# Patient Record
Sex: MALE | Race: WHITE | NOT HISPANIC OR LATINO | Employment: OTHER | URBAN - METROPOLITAN AREA
[De-identification: names, ages, dates, MRNs, and addresses within clinical notes are randomized per-mention and may not be internally consistent; named-entity substitution may affect disease eponyms.]

---

## 2024-03-21 ENCOUNTER — LAB VISIT (OUTPATIENT)
Dept: LAB | Facility: HOSPITAL | Age: 74
End: 2024-03-21
Attending: UROLOGY
Payer: MEDICARE

## 2024-03-21 ENCOUNTER — OFFICE VISIT (OUTPATIENT)
Dept: UROLOGY | Facility: CLINIC | Age: 74
End: 2024-03-21
Payer: MEDICARE

## 2024-03-21 VITALS
HEART RATE: 77 BPM | WEIGHT: 151.88 LBS | BODY MASS INDEX: 22.49 KG/M2 | DIASTOLIC BLOOD PRESSURE: 76 MMHG | HEIGHT: 69 IN | RESPIRATION RATE: 16 BRPM | SYSTOLIC BLOOD PRESSURE: 116 MMHG

## 2024-03-21 DIAGNOSIS — C61 PROSTATE CA: ICD-10-CM

## 2024-03-21 DIAGNOSIS — Z87.442 PERSONAL HISTORY OF URINARY CALCULI: ICD-10-CM

## 2024-03-21 DIAGNOSIS — R35.0 FREQUENCY OF MICTURITION: ICD-10-CM

## 2024-03-21 DIAGNOSIS — R35.1 NOCTURIA: Primary | ICD-10-CM

## 2024-03-21 LAB
BILIRUB UR QL STRIP: NEGATIVE
GLUCOSE UR QL STRIP: NEGATIVE
KETONES UR QL STRIP: NEGATIVE
LEUKOCYTE ESTERASE UR QL STRIP: NEGATIVE
PH, POC UA: 5.5
POC BLOOD, URINE: NEGATIVE
POC NITRATES, URINE: NEGATIVE
POC RESIDUAL URINE VOLUME: 0 ML (ref 0–100)
PROT UR QL STRIP: NEGATIVE
SP GR UR STRIP: 1.03 (ref 1–1.03)
UROBILINOGEN UR STRIP-ACNC: 0.2 (ref 0.3–2.2)

## 2024-03-21 PROCEDURE — 81003 URINALYSIS AUTO W/O SCOPE: CPT | Mod: PBBFAC,PN | Performed by: UROLOGY

## 2024-03-21 PROCEDURE — 84153 ASSAY OF PSA TOTAL: CPT | Performed by: UROLOGY

## 2024-03-21 PROCEDURE — 99999 PR PBB SHADOW E&M-NEW PATIENT-LVL III: CPT | Mod: PBBFAC,,, | Performed by: UROLOGY

## 2024-03-21 PROCEDURE — 36415 COLL VENOUS BLD VENIPUNCTURE: CPT | Mod: PN | Performed by: UROLOGY

## 2024-03-21 PROCEDURE — 99999PBSHW POCT URINALYSIS, DIPSTICK, AUTOMATED, W/O SCOPE: Mod: PBBFAC,,,

## 2024-03-21 PROCEDURE — 51798 US URINE CAPACITY MEASURE: CPT | Mod: PBBFAC,PN | Performed by: UROLOGY

## 2024-03-21 PROCEDURE — 99999PBSHW POCT BLADDER SCAN: Mod: PBBFAC,,,

## 2024-03-21 PROCEDURE — 99203 OFFICE O/P NEW LOW 30 MIN: CPT | Mod: PBBFAC,PN,25 | Performed by: UROLOGY

## 2024-03-21 PROCEDURE — 99214 OFFICE O/P EST MOD 30 MIN: CPT | Mod: S$PBB,,, | Performed by: UROLOGY

## 2024-03-21 RX ORDER — IBUPROFEN 100 MG/5ML
1 SUSPENSION, ORAL (FINAL DOSE FORM) ORAL EVERY MORNING
COMMUNITY

## 2024-03-21 RX ORDER — OMEPRAZOLE 20 MG/1
20 CAPSULE, DELAYED RELEASE ORAL 2 TIMES DAILY
COMMUNITY

## 2024-03-21 RX ORDER — HYDROCODONE BITARTRATE AND ACETAMINOPHEN 10; 325 MG/1; MG/1
1 TABLET ORAL
COMMUNITY

## 2024-03-21 RX ORDER — MELOXICAM 15 MG/1
1 TABLET ORAL EVERY MORNING
COMMUNITY
Start: 2024-03-06

## 2024-03-21 RX ORDER — CYCLOBENZAPRINE HCL 10 MG
10 TABLET ORAL 3 TIMES DAILY PRN
COMMUNITY
Start: 2024-01-04

## 2024-03-21 RX ORDER — ALPRAZOLAM 0.5 MG/1
1 TABLET ORAL NIGHTLY
COMMUNITY
Start: 2023-07-05

## 2024-03-21 RX ORDER — CHOLECALCIFEROL (VITAMIN D3) 25 MCG
2 TABLET ORAL EVERY MORNING
COMMUNITY

## 2024-03-21 RX ORDER — SOLIFENACIN SUCCINATE 5 MG/1
5 TABLET, FILM COATED ORAL NIGHTLY
Qty: 90 TABLET | Refills: 1 | Status: SHIPPED | OUTPATIENT
Start: 2024-03-21 | End: 2025-03-21

## 2024-03-21 RX ORDER — TRAZODONE HYDROCHLORIDE 150 MG/1
1 TABLET ORAL NIGHTLY
COMMUNITY
Start: 2024-02-05

## 2024-03-21 NOTE — PROGRESS NOTES
Subjective:       Patient ID: Lobo Guerrier is a 73 y.o. male.    Chief Complaint: Prostate Cancer      History of Present Illness:     Mr Guerrier says that he recently stopped taking myrbetriq 25 mg that I prescribed him on 6-26-23 because it was too expensive.  He says that it was not working either.  Nocturia X 3.  He has daytime urinary frequency.  Infrequent urgency.  No urinary incontinence.  Occasional intermittent urinary stream.  He says that he was treated with a 10 day course of an antibiotic in February 2024 for a UTI.  He says he had some blood in his urine at that time that resolved with the antibiotic.  No further blood in his urine.  No dysuria.  No flank pain.  He is here today with his wife.         Past Medical History:   Diagnosis Date    Prostate CA     Skin cancer     Throat cancer      Family History   Problem Relation Age of Onset    Cancer Father     Kidney cancer Father     Diabetes Mother     Kidney cancer Mother     No Known Problems Paternal Aunt     Cancer Paternal Uncle     No Known Problems Paternal Grandmother     Cancer Paternal Grandfather     No Known Problems Maternal Grandmother     No Known Problems Maternal Grandfather     No Known Problems Other     Throat cancer Brother      Social History     Socioeconomic History    Marital status:    Tobacco Use    Smoking status: Former     Types: Cigarettes    Smokeless tobacco: Never    Tobacco comments:     Quit in 2007   Substance and Sexual Activity    Alcohol use: Not Currently    Drug use: Never    Sexual activity: Not Currently     Partners: Female     Outpatient Encounter Medications as of 3/21/2024   Medication Sig Dispense Refill    ALPRAZolam (XANAX) 0.5 MG tablet Take 1 tablet by mouth every evening.      ascorbic acid, vitamin C, (VITAMIN C) 1000 MG tablet Take 1 tablet by mouth every morning.      cyclobenzaprine (FLEXERIL) 10 MG tablet Take 10 mg by mouth 3 (three) times daily as needed for Muscle spasms.       "HYDROcodone-acetaminophen (NORCO)  mg per tablet Take 1 tablet by mouth every 24 hours as needed for Pain (Half a tablet at night).      meloxicam (MOBIC) 15 MG tablet Take 1 tablet by mouth every morning.      omeprazole (PRILOSEC) 20 MG capsule Take 20 mg by mouth 2 (two) times daily.      traZODone (DESYREL) 150 MG tablet Take 1 tablet by mouth every evening.      vitamin D (VITAMIN D3) 1000 units Tab Take 2 tablets by mouth every morning.      solifenacin (VESICARE) 5 MG tablet Take 1 tablet (5 mg total) by mouth every evening. 90 tablet 1     No facility-administered encounter medications on file as of 3/21/2024.        Review of Systems   Constitutional:  Negative for chills and fever.   Respiratory:  Negative for shortness of breath.    Cardiovascular:  Negative for chest pain.   Gastrointestinal:  Negative for nausea and vomiting.   Genitourinary:  Negative for hematuria.   Musculoskeletal:  Negative for back pain.   Skin:  Negative for rash.   Neurological:  Negative for dizziness.   Psychiatric/Behavioral:  Negative for agitation.        Objective:     /76 (BP Location: Left arm, Patient Position: Sitting)   Pulse 77   Resp 16   Ht 5' 9" (1.753 m)   Wt 68.9 kg (151 lb 14.4 oz)   BMI 22.43 kg/m²     Physical Exam  Constitutional:       Appearance: Normal appearance.   Pulmonary:      Effort: Pulmonary effort is normal.   Abdominal:      Palpations: Abdomen is soft.   Neurological:      Mental Status: He is alert and oriented to person, place, and time.   Psychiatric:         Mood and Affect: Mood normal.         Office Visit on 03/21/2024   Component Date Value Ref Range Status    POC Blood, Urine 03/21/2024 Negative  Negative Final    POC Bilirubin, Urine 03/21/2024 Negative  Negative Final    POC Urobilinogen, Urine 03/21/2024 0.2 (A)  0.3 - 2.2 Final    POC Ketones, Urine 03/21/2024 Negative  Negative Final    POC Protein, Urine 03/21/2024 Negative  Negative Final    POC Nitrates, Urine " 03/21/2024 Negative  Negative Final    POC Glucose, Urine 03/21/2024 Negative  Negative Final    pH, UA 03/21/2024 5.5   Final    POC Specific Gravity, Urine 03/21/2024 1.030 (A)  1.003 - 1.029 Final    POC Leukocytes, Urine 03/21/2024 Negative  Negative Final    POC Residual Urine Volume 03/21/2024 0  0 - 100 mL Final        Results for orders placed or performed in visit on 03/21/24 (from the past 8760 hour(s))   POCT Bladder Scan   Result Value    POC Residual Urine Volume 0          6-26-23  PSA <0.006    12-20-22  PSA <0.006      I reviewed all of the above lab results.         12-20-22  Renal ultrasound.  No renal stone is seen bilaterally.  No hydronephrosis bilaterally.  1.2 cm right renal cyst.  1.7 cm and 1 cm left renal cysts.    12-20-22  KUB.  No renal stone is seen bilaterally.  No stone is seen along the course of his ureters.  A few left greater than right pelvic calcifications that are likely phleboliths.      I reviewed all of the above imaging results.       Assessment:       1. Nocturia    2. Prostate CA    3. Frequency of micturition    4. Personal history of urinary calculi      Plan:     Orders Placed This Encounter    PROSTATE SPECIFIC ANTIGEN, DIAGNOSTIC    POCT Urinalysis, Dipstick, Automated, W/O Scope    POCT Bladder Scan    solifenacin (VESICARE) 5 MG tablet        Assessment:  - Nocturia and frequency.  He recently stopped taking myrbetriq 25 mg that I prescribed him on 6-26-23 because it was too expensive and it was not working.  - Prostate cancer s/p robotic radical prostatectomy with bilateral pelvic lymphadenectomy by me on 7-16-18.  Path:  Prostate adenocarcinoma allyson 3+4=7, 20% pattern 4, perineural invasion is present, right and left apical margins are involved, pT2N0.    - All of his PSAs have been undetectable since his surgery.  - History of a 1.2 cm right proximal ureteral stone s/p right USE with laser lithotripsy with right ureteral stent exchange on 6-11-21.  His right  ureteral stent was removed cystoscopically on 6-15-21.  - History of a few ESWLs in the past.  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  He has completed radiation and chemotherapy treatments.    Plan:  - PSA today.  - Started solifenacin 5 mg 1 PO qhs.  I told him to let me know if his mouth gets too dry on the solifenacin.  - I discussed dietary modifications with him today and I recommended he drink mostly water during the day.  - I recommended he limit his fluid intake at night to small amounts of water and to void just prior to bedtime.   - RTC in 6 months with a PVR on arrival.

## 2024-03-22 LAB — COMPLEXED PSA SERPL-MCNC: <0.01 NG/ML (ref 0–4)

## 2024-03-25 DIAGNOSIS — C61 PROSTATE CANCER: Primary | ICD-10-CM

## 2024-03-26 ENCOUNTER — TELEPHONE (OUTPATIENT)
Dept: UROLOGY | Facility: CLINIC | Age: 74
End: 2024-03-26
Payer: MEDICARE

## 2024-03-26 NOTE — TELEPHONE ENCOUNTER
I called and spoke with Mr. Guerrier, he is aware of details, I scheduled his lab appointment a few days prior to his appointment and sent him an email in regards to all of his appointment dates.    ----- Message from Ibrahima Mcqueen MD sent at 3/25/2024  4:51 PM CDT -----  I called Mr Guerrier 2 times and he did not answer the phone either time and I left him a voice message.  Please call Mr Guerrier to see if he got my voice message that said that I reviewed his PSA is undetectable at <0.01 which is great.  Remind him of the details of his follow up appointment in 6 months in [place].  Let him know that I ordered a PSA in that he can do a few days prior to his 6 month follow up appointment.  Ask him if he has any questions.

## 2024-09-30 ENCOUNTER — LAB VISIT (OUTPATIENT)
Dept: LAB | Facility: HOSPITAL | Age: 74
End: 2024-09-30
Attending: UROLOGY
Payer: MEDICARE

## 2024-09-30 DIAGNOSIS — C61 PROSTATE CANCER: ICD-10-CM

## 2024-09-30 PROCEDURE — 36415 COLL VENOUS BLD VENIPUNCTURE: CPT | Mod: PN | Performed by: UROLOGY

## 2024-09-30 PROCEDURE — 84153 ASSAY OF PSA TOTAL: CPT | Performed by: UROLOGY

## 2024-10-01 LAB — COMPLEXED PSA SERPL-MCNC: <0.01 NG/ML (ref 0–4)

## 2024-10-03 ENCOUNTER — OFFICE VISIT (OUTPATIENT)
Dept: UROLOGY | Facility: CLINIC | Age: 74
End: 2024-10-03
Payer: MEDICARE

## 2024-10-03 VITALS
HEIGHT: 69 IN | WEIGHT: 152.56 LBS | RESPIRATION RATE: 18 BRPM | DIASTOLIC BLOOD PRESSURE: 72 MMHG | HEART RATE: 73 BPM | BODY MASS INDEX: 22.6 KG/M2 | TEMPERATURE: 98 F | SYSTOLIC BLOOD PRESSURE: 134 MMHG

## 2024-10-03 DIAGNOSIS — N32.81 OVERACTIVE BLADDER: ICD-10-CM

## 2024-10-03 DIAGNOSIS — C61 PROSTATE CANCER: ICD-10-CM

## 2024-10-03 DIAGNOSIS — Z87.442 PERSONAL HISTORY OF URINARY CALCULI: ICD-10-CM

## 2024-10-03 DIAGNOSIS — R35.1 NOCTURIA: Primary | ICD-10-CM

## 2024-10-03 DIAGNOSIS — N39.3 SUI (STRESS URINARY INCONTINENCE), MALE: ICD-10-CM

## 2024-10-03 LAB
BILIRUB UR QL STRIP: NEGATIVE
GLUCOSE UR QL STRIP: NEGATIVE
KETONES UR QL STRIP: NEGATIVE
LEUKOCYTE ESTERASE UR QL STRIP: NEGATIVE
PH, POC UA: 6.5
POC BLOOD, URINE: NEGATIVE
POC NITRATES, URINE: NEGATIVE
POC RESIDUAL URINE VOLUME: 43 ML (ref 0–100)
PROT UR QL STRIP: NEGATIVE
SP GR UR STRIP: 1.02 (ref 1–1.03)
UROBILINOGEN UR STRIP-ACNC: 1 (ref 0.3–2.2)

## 2024-10-03 PROCEDURE — 81003 URINALYSIS AUTO W/O SCOPE: CPT | Mod: PBBFAC,PN | Performed by: UROLOGY

## 2024-10-03 PROCEDURE — 99999PBSHW POCT URINALYSIS, DIPSTICK, AUTOMATED, W/O SCOPE: Mod: PBBFAC,,,

## 2024-10-03 PROCEDURE — 99214 OFFICE O/P EST MOD 30 MIN: CPT | Mod: S$PBB,,, | Performed by: UROLOGY

## 2024-10-03 PROCEDURE — 99999 PR PBB SHADOW E&M-EST. PATIENT-LVL III: CPT | Mod: PBBFAC,,, | Performed by: UROLOGY

## 2024-10-03 PROCEDURE — 99213 OFFICE O/P EST LOW 20 MIN: CPT | Mod: PBBFAC,PN | Performed by: UROLOGY

## 2024-10-03 PROCEDURE — 51798 US URINE CAPACITY MEASURE: CPT | Mod: PBBFAC,PN | Performed by: UROLOGY

## 2024-10-03 PROCEDURE — 99999PBSHW POCT BLADDER SCAN: Mod: PBBFAC,,,

## 2024-10-03 RX ORDER — SOLIFENACIN SUCCINATE 5 MG/1
5 TABLET, FILM COATED ORAL NIGHTLY
Qty: 90 TABLET | Refills: 3 | Status: SHIPPED | OUTPATIENT
Start: 2024-10-03 | End: 2025-10-03

## 2024-10-03 NOTE — PROGRESS NOTES
Subjective:       Patient ID: Lobo Guerrier is a 74 y.o. male.    Chief Complaint: Follow-up (6 month f/u)      History of Present Illness:     Mr Guerrier is here today with his wife.  His nocturia improved from X 3 on average to X 1 on solifenacin 5 mg.  Urinary urgency and frequency has improved.  He has dry mouth that has not worsened on the solifenacin 5 mg.  He stopped taking myrbetriq 25 mg that I prescribed him on 6-26-23 because it was too expensive and it was not working.  He has mild infrequent DEYVI.  He wears 1 depends daily.    He underwent a robotic radical prostatectomy with bilateral pelvic lymphadenectomy for prostate cancer by me on 7-16-18.  Path:  Prostate adenocarcinoma allyson 3+4=7, 20% pattern 4, perineural invasion is present, right and left apical margins are involved, pT2N0.  All of his PSAs have been undetectable since his surgery.    He has a history of a 1.2 cm right proximal ureteral stone s/p right USE with laser lithotripsy with right ureteral stent exchange on 6-11-21.  His right ureteral stent was removed cystoscopically on 6-15-21.  He has a history of a few ESWLs in the past.    Follow-up  Pertinent negatives include no chest pain, chills, fever, nausea, rash or vomiting.        Past Medical History:   Diagnosis Date    Prostate CA     Skin cancer     Throat cancer      Family History   Problem Relation Name Age of Onset    Cancer Father      Kidney cancer Father      Diabetes Mother      Kidney cancer Mother      No Known Problems Paternal Aunt      Cancer Paternal Uncle      No Known Problems Paternal Grandmother      Cancer Paternal Grandfather      No Known Problems Maternal Grandmother      No Known Problems Maternal Grandfather      No Known Problems Other      Throat cancer Brother       Social History     Socioeconomic History    Marital status:    Tobacco Use    Smoking status: Former     Types: Cigarettes    Smokeless tobacco: Never    Tobacco comments:     Quit in  2007   Substance and Sexual Activity    Alcohol use: Not Currently    Drug use: Never    Sexual activity: Not Currently     Partners: Female     Social Drivers of Health     Financial Resource Strain: Low Risk  (6/26/2024)    [facility]    Overall Financial Resource Strain (CARDIA)     Difficulty of Paying Living Expenses: Not very hard   Food Insecurity: No Food Insecurity (6/26/2024)    [facility]    Hunger Vital Sign     Worried About Running Out of Food in the Last Year: Never true     Ran Out of Food in the Last Year: Never true   Transportation Needs: No Transportation Needs (6/26/2024)    [facility]    PRAPARE - Transportation     Lack of Transportation (Medical): No     Lack of Transportation (Non-Medical): No   Physical Activity: Sufficiently Active (6/26/2024)    [facility]    Exercise Vital Sign     Days of Exercise per Week: 3 days     Minutes of Exercise per Session: 90 min   Stress: No Stress Concern Present (6/26/2024)    [facility]    [employer] - Occupational Stress Questionnaire     Feeling of Stress : Not at all   Housing Stability: Low Risk  (6/26/2024)    [facility]    Housing Stability Vital Sign     Unable to Pay for Housing in the Last Year: No     Number of Places Lived in the Last Year: 1     Unstable Housing in the Last Year: No     Outpatient Encounter Medications as of 10/3/2024   Medication Sig Dispense Refill    ALPRAZolam (XANAX)  "0.5 MG tablet Take 1 tablet by mouth every evening.      ascorbic acid, vitamin C, (VITAMIN C) 1000 MG tablet Take 1 tablet by mouth every morning.      cyclobenzaprine (FLEXERIL) 10 MG tablet Take 10 mg by mouth 3 (three) times daily as needed for Muscle spasms.      HYDROcodone-acetaminophen (NORCO)  mg per tablet Take 1 tablet by mouth every 24 hours as needed for Pain (Half a tablet at night).      meloxicam (MOBIC) 15 MG tablet Take 1 tablet by mouth every morning.      omeprazole (PRILOSEC) 20 MG capsule Take 20 mg by mouth 2 (two) times daily.      solifenacin (VESICARE) 5 MG tablet Take 1 tablet (5 mg total) by mouth every evening. 90 tablet 1    traZODone (DESYREL) 150 MG tablet Take 1 tablet by mouth every evening.      vitamin D (VITAMIN D3) 1000 units Tab Take 2 tablets by mouth every morning.      solifenacin (VESICARE) 5 MG tablet Take 1 tablet (5 mg total) by mouth nightly. 90 tablet 3     No facility-administered encounter medications on file as of 10/3/2024.        Review of Systems   Constitutional:  Negative for chills and fever.   Respiratory:  Negative for shortness of breath.    Cardiovascular:  Negative for chest pain.   Gastrointestinal:  Negative for nausea and vomiting.   Genitourinary:  Negative for difficulty urinating and hematuria.   Musculoskeletal:  Negative for back pain.   Skin:  Negative for rash.   Neurological:  Negative for dizziness.   Psychiatric/Behavioral:  Negative for agitation.        Objective:     /72   Pulse 73   Temp 97.8 °F (36.6 °C) (Oral)   Resp 18   Ht 5' 9" (1.753 m)   Wt 69.2 kg (152 lb 8.9 oz)   BMI 22.53 kg/m²     Physical Exam  Constitutional:       Appearance: Normal appearance.   Pulmonary:      Effort: Pulmonary effort is normal.   Abdominal:      Palpations: Abdomen is soft.   Neurological:      Mental Status: He is alert and oriented to person, place, and time.   Psychiatric:         Mood and Affect: Mood normal.         No visits with " results within 1 Day(s) from this visit.   Latest known visit with results is:   Lab Visit on 09/30/2024   Component Date Value Ref Range Status    PSA Diagnostic 09/30/2024 <0.01  0.00 - 4.00 ng/mL Final    Comment: The testing method is a chemiluminescent microparticle immunoassay   manufactured by [employer] and performed on the [employer]   or   [employer] Survey system. Values obtained with different assay manufacturers   for   methods may be different and cannot be used interchangeably.  PSA Expected levels:  Hormonal Therapy: <0.05 ng/ml  Prostatectomy: <0.01 ng/ml  Radiation Therapy: <1.00 ng/ml          Results for orders placed or performed in visit on 03/21/24 (from the past 8760 hours)   POCT Bladder Scan   Result Value    POC Residual Urine Volume 0        Assessment:       1. Nocturia    2. Overactive bladder    3. Prostate cancer    4. DEYVI (stress urinary incontinence), male    5. Personal history of urinary calculi      Plan:     Orders Placed This Encounter    PROSTATE SPECIFIC ANTIGEN, DIAGNOSTIC    solifenacin (VESICARE) 5 MG tablet          12-20-22  Renal ultrasound.  No renal stone is seen bilaterally.  No hydronephrosis bilaterally.  1.2 cm right renal cyst.  1.7 cm and 1 cm left renal cysts.     12-20-22  KUB.  No renal stone is seen bilaterally.  No stone is seen along the course of his ureters.  A few left greater than right pelvic calcifications that are likely phleboliths.     I reviewed all of the above imaging results.         9-30-24  PSA <0.01    3-21-24  PSA <0.01    6-26-23  PSA <0.006     12-20-22  PSA <0.006    8-29-24  Cr 1.04.  GFR 75.    8-29-24  HgbA1c 5.3     I reviewed all of the above lab results.          Assessment:  - Nocturia and OAB have improved with solifenacin 5 mg.  He stopped taking myrbetriq 25 mg that I prescribed him on 6-26-23 because it was too expensive and it was not working.  - Mild infrequent DEYVI.  He wears 1 depends daily.  - Prostate cancer s/p robotic  radical prostatectomy with bilateral pelvic lymphadenectomy by me on 7-16-18.  Path:  Prostate adenocarcinoma allyson 3+4=7, 20% pattern 4, perineural invasion is present, right and left apical margins are involved, pT2N0.  All of his PSAs have been undetectable since his surgery.  - History of a 1.2 cm right proximal ureteral stone s/p right USE with laser lithotripsy with right ureteral stent exchange on 6-11-21.  His right ureteral stent was removed cystoscopically on 6-15-21.  - History of a few ESWLs in the past.  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  He has completed radiation and chemotherapy treatments.     Plan:  - Refilled solifenacin 5 mg 1 PO qhs that was started on 3-21-24.    - I discussed dietary modifications with him today and I recommended he drink mostly water during the day.  - I recommended he limit his fluid intake at night to small amounts of water and to void just prior to bedtime.   - PSA in 1 year a few days prior to a 1 year appointment with a PVR on arrival.